# Patient Record
(demographics unavailable — no encounter records)

---

## 2024-11-07 NOTE — PHYSICAL EXAM

## 2024-11-07 NOTE — HISTORY OF PRESENT ILLNESS
[FreeTextEntry1] : Thank you for consult.  Patient is an 86 y/o female with a PMHx of Anxiety Disorder, hyperlipidemia, hypertension, and hypothyroidism who presents c/o unexplained weight loss of 35 lbs over the past year, referred by his PCP, Dr. Blayne Rocha. Pt states that she has not had any imaging done yet. Pt denies decreased appetite, rectal bleeding. Pt has never had an EGD or screening colonoscopy.

## 2024-11-07 NOTE — ASSESSMENT
[FreeTextEntry1] : Referred pt for CT Abdomen and Pelvis, will f/u with results. Will consider EGD-Colonoscopy if CT Abdomen and Pelvis is unremarkable. Pt expressed understanding and agrees with the plan.  I spent 45 minutes with the patient as well as reviewing documents prior to and after the office visit. Patient verbalized understanding of all information provided. All questions answered and reviewed.  Robert Brunner, MD

## 2024-11-07 NOTE — REVIEW OF SYSTEMS
[As Noted in HPI] : as noted in HPI [Recent Weight Loss (___ Lbs)] : recent [unfilled] ~Ulb weight loss [Negative] : Heme/Lymph [Bleeding] : no bleeding [FreeTextEntry7] : no decreased appetite